# Patient Record
Sex: FEMALE | Race: WHITE | URBAN - METROPOLITAN AREA
[De-identification: names, ages, dates, MRNs, and addresses within clinical notes are randomized per-mention and may not be internally consistent; named-entity substitution may affect disease eponyms.]

---

## 2020-09-11 ENCOUNTER — HOSPITAL ENCOUNTER (OUTPATIENT)
Dept: PHYSICAL THERAPY | Age: 35
Discharge: HOME | End: 2020-09-11
Payer: COMMERCIAL

## 2020-09-11 PROCEDURE — 97161 PT EVAL LOW COMPLEX 20 MIN: CPT | Performed by: PHYSICAL THERAPIST

## 2020-09-11 PROCEDURE — 97110 THERAPEUTIC EXERCISES: CPT | Performed by: PHYSICAL THERAPIST

## 2020-09-11 NOTE — PROGRESS NOTES
[facility] at [address] [place] [address] Drive: [phone]   Fax: [phone]  PLAN OF CARE / [address] [place] [address] FOR PHYSICAL THERAPY SERVICES  Patient Name: Tricia Ruvalcaba : 1985   Medical   Diagnosis: Low back pain [M54.5] Treatment Diagnosis: Low back pain   Onset Date: 9/3/2020     Referral Source: Eric Rosa MD Start of Care [facility]): 2020   Prior Hospitalization: See medical history Provider #: [id number]   Prior Level of Function: Active, biking, walking, sports, independent w/ ADLs   Comorbidities: unremarkable   Medications: Verified on Patient Summary List   The Plan of Care and following information is based on the information from the initial evaluation.   ===========================================================================================  Assessment / key information:  Tricia Ruvalcaba is a 38 y.o.  yo female presents with signs/symptoms consistent with left sided lumbar radicular pain. Pain is alleviated with seated sciatic nerve glides. Patient will continue to benefit from skilled PT services to modify and progress therapeutic interventions, address functional mobility deficits, address ROM deficits, address strength deficits, analyze and address soft tissue restrictions, analyze and cue movement patterns, analyze and modify body mechanics/ergonomics and assess and modify postural abnormalities to attain remaining goals. .    Pt instructed in HEP and will f/u in clinic for PT.  ===========================================================================================  Eval Complexity: History MEDIUM  Complexity : 1-2 comorbidities / personal factors will impact the outcome/ POC ;  Examination  LOW Complexity : 1-2 Standardized tests and measures addressing body structure, function, activity limitation and / or participation in recreation ; Presentation LOW Complexity : Stable, uncomplicated ;  Decision Making MEDIUM Complexity : FOTO score of 26-74; Overall Complexity LOW   Problem List: pain affecting function, decrease ROM, decrease strength, decrease ADL/ functional abilitiies, decrease activity tolerance, decrease flexibility/ joint mobility and decrease transfer abilities   Treatment Plan may include any combination of the following: Therapeutic exercise, Therapeutic activities, Neuromuscular re-education, Physical agent/modality, Gait/balance training, Manual therapy, Patient education and Self Care training  Patient / Family readiness to learn indicated by: asking questions, trying to perform skills and interest  Persons(s) to be included in education: patient (P)  Barriers to Learning/Limitations: no  Measures Taken: NA  Patient Goal (s): \"Exercises/stretches to help strengthen core to prevent flare ups\"   Patient self reported health status: fair  Rehabilitation Potential: good  Goals:  Short Term Goals: To be accomplished in 2 weeks:   Patient will report compliance with HEP 1x/day to aid in rehabilitation program.   Status at IE: NA   Current: Same as IE      Patient will increase lumbar ROM to 100% in all planes to aid in completion of ADLs. Status at IE:75% grossly   Current: Same as IE    Long Term Goals: To be accomplished in 6 weeks:   Patient will increase B LE and trunk strength to 5/5 MMT throughout to aid in completion of ADLs. Status at IE:4/5 in trunk   Current: Same as IE     Patient will report pain no greater than 1-2/10 throughout entire day to aid in completion of ADLs. Status at IE:0-6   Current: Same as IE     Patent will be able to sit for 30mins to be able to drive to appointments and complete ADLs. Status at [address] [facility] after sitting for 5-10 mins   Current: Same as IE     Patient will improve FOTO score to 78 points to demonstrate improvement in functional status.    Status at IE:60   Current: Same as IE   *FOTO score is an established functional score where 100 = no disability*    Frequency / Duration:   Patient to be seen 1  times per week for 6  weeks:  Patient / Caregiver education and instruction: self care and exercises      . Therapist Signature: Amadeo Vilchis DPT, OCS Date: 6/40/7643   Certification Period: NA Time: 4:28 PM   ===========================================================================================  I certify that the above Physical Therapy Services are being furnished while the patient is under my care. I agree with the treatment plan and certify that this therapy is necessary. Physician Signature:        Date:       Time:     Please sign and return to [facility] or you may fax the signed copy to [phone]. Thank you.

## 2020-09-11 NOTE — PROGRESS NOTES
PT DAILY TREATMENT NOTE/LUMBAR EVAL 10-18    Patient Name: Yolie Solis  Date:2020  : 1985  [x]  Patient  Verified  Payor: Alexis Morgan / Plan: [employer] / Product Type: HMO /    In time:3:15  Out time:4:00  Total Treatment Time (min): 45  Visit #: 1 of 6    Treatment Area: Low back pain [M54.5]  SUBJECTIVE  Pain Level (0-10 scale): 1  []constant []intermittent []improving []worsening []no change since onset    Any medication changes, allergies to medications, adverse drug reactions, diagnosis change, or new procedure performed?: [x] No    [] Yes (see summary sheet for update)  Subjective functional status/changes:     Patient has c/c of low back pain since 1 week. . Pain is located low back pain,. BHAVIN: denies, maybe stress. Patient describes pain as tightening, sore, intermittent. No diurnal features. Denies numbness/tingling. Denies popping/clicking. Aggravating factors:sitting, sit<>stand, left hip knee extension. Alleviating factors: foam roller, standing, moving around. Denies red flags: SOB, chest pain, dizziness/lightheadedness, blurred/double vision, HA, chills/fevers, night sweats, change in bowel/bladder control, abdominal pain, difficulty swallowing, slurred speech, unexplained weight gain/loss, nausea, vomiting. PMHx: unremarkable. Surgical Hx: unremarkable. Social Hx: 2 level home, occasional alcohol, denies tobacco, work status. PLOF: walking, sports, bicycle. CLOF: unable to do bicycle. Diagnostic Imaging: x-ray: unremarkable, OA: . Recent falls Hx:NA.       OBJECTIVE/EXAMINATION    15 min []Eval                  []Re-Eval       30 min Therapeutic Exercise:  [] See flow sheet :   Rationale: increase ROM, increase strength and decrease pain to improve the patients ability to complete ADLs          With   [] TE   [] TA   [] neuro   [] other: Patient Education: [x] Review HEP    [] Progressed/Changed HEP based on:   [] positioning   [] body mechanics   [] transfers   [] heat/ice application    [] other:      Physical Therapy Evaluation - Lumbar Spine      OBJECTIVE  Posture:  Lateral Shift: [] R    [] L     [] +  [x] -  Kyphosis: [] Increased [] Decreased   []  WNL  Lordosis:  [] Increased [] Decreased   [] WNL  Pelvic symmetry: [] WNL    [] Other:    Gait:  [] Normal     [] Abnormal:    Active Movements: [] N/A   [] Too acute   [] Other:  ROM % AROM Comments:pain, area   Forward flexion 40-60 100    Extension 20-30 75%    SB right 20-30 75    SB left 20-30 75    Rotation right 5-10 100    Rotation left 5-10 100      Repeated Movements   Effects on present pain: produces (ME), abolishes (A), increases (incr), decreases (decr), centralizes (C), peripheral (PH), no effect (NE)   Pre-Test Sx Flexion Repeated Flexion Extension Repeated Extension Repeated SBL Repeated SBR   Sitting          Standing    pain NE     Lying      N/A N/A   Comments:  Side Glide:  Sustained passive positioning test:    Neuro Screen [] WNL  Myotome/Dermatome/Reflexes:  Comments:    Dural Mobility:  SLR Supine: [] R    [x] L    [x] +    [] -  @ (degrees):   Slump Test: [] R    [x] L    [x] +    [] -  @ (degrees):   Prone Knee Bend: [] R    [] L    [] +    [] -     Palpation  [] Min  [] Mod  [] Severe    Location:  [] Min  [] Mod  [] Severe    Location:    Strength   L(0-5) R (0-5) N/T   Hip Flexion (L1,2) 4+ 5 []   Knee Extension (L3,4) 5 5 []   Ankle Dorsiflexion (L4) 5 5 []   Great Toe Extension (L5)   [x]   Ankle Plantarflexion (S1) 5 5 []   Knee Flexion (S1,2) 5 5 []   Abdominals 4 4 []   Paraspinals 4 4 []   Back Rotators 4 4 []   Gluteus Melvin 4+ 4+ []   Hip Abduction 4 4 []         Special Tests  Lumbar:  Lumb.  Compression: [] Pos  [] Neg               Lumbar Distraction:   [] Pos  [] Neg    Quadrant:  [] Pos  [] Neg   [] Flex  [] Ext    Sacroilliac:  Gaenslen's: [] R    [] L    [] +    [x] -     Compression: [] +    [] -     Gapping:  [] +    [] -     Thigh Thrust: [] R    [] L    [] +    [] - Hip: Hedda Uriah:  [] R    [] L    [] +    [x] -     Scour:  [] R    [] L    [] +    [x] -     Piriformis: [] R    [] L    [] +    [] -     Other tests/comments:       Pain Level (0-10 scale) post treatment: 0    ASSESSMENT/Changes in Function: Patient presents with signs/symptoms consistent with left sided lumbar radicular pain. Pain is alleviated with seated sciatic nerve glides. Patient will continue to benefit from skilled PT services to modify and progress therapeutic interventions, address functional mobility deficits, address ROM deficits, address strength deficits, analyze and address soft tissue restrictions, analyze and cue movement patterns, analyze and modify body mechanics/ergonomics and assess and modify postural abnormalities to attain remaining goals.      [x]  See Plan of Care  []  See progress note/recertification  []  See Discharge Summary         Progress towards goals / Updated goals:  See POC    PLAN  []  Upgrade activities as tolerated     [x]  Continue plan of care  []  Update interventions per flow sheet       []  Discharge due to:_  []  Other:_      Jessika Kunz, PT, DPT 9/11/2020  3:25 PM

## 2020-09-16 ENCOUNTER — HOSPITAL ENCOUNTER (OUTPATIENT)
Dept: PHYSICAL THERAPY | Age: 35
Discharge: HOME | End: 2020-09-16
Payer: COMMERCIAL

## 2020-09-16 PROCEDURE — 97110 THERAPEUTIC EXERCISES: CPT | Performed by: PHYSICAL THERAPIST

## 2020-09-16 NOTE — PROGRESS NOTES
PT DAILY TREATMENT NOTE    Patient Name: Marion Yusuf  Date:2020  : 1985  [x]  Patient  Verified  Payor: Dulce Maria Stable / Plan: [employer] / Product Type: HMO /    In time:8:03  Out time:8:56  Total Treatment Time (min): 53  Total Timed Codes (min): 48  Visit #: 2 of 6    Treatment Area: Low back pain [M54.5]    SUBJECTIVE  Pain Level (0-10 scale): 3  Any medication changes, allergies to medications, adverse drug reactions, diagnosis change, or new procedure performed?: [x] No    [] Yes (see summary sheet for update)  Subjective functional status/changes:   [] No changes reported  Feels okay. The nerve glide seems to be helping, but I might have had some more numbness, not sure if I'm over examining. OBJECTIVE    53 min Therapeutic Exercise:  [x] See flow sheet :   Rationale: increase ROM, increase strength and decrease pain to improve the patients ability to complete ADLs    With   [] TE   [] TA   [] neuro   [] other: Patient Education: [x] Review HEP    [] Progressed/Changed HEP based on:   [] positioning   [] body mechanics   [] transfers   [] heat/ice application    [] other:      Other Objective/Functional Measures: NA     Pain Level (0-10 scale) post treatment: 3    ASSESSMENT/Changes in Function: Patient responds well to treatment session. Patient is progressing well. Requires minimal cues for exercise form/technique. Provided with HEP. Will progress as tolerated.  No adverse effects were noted from today's treatment session       Patient will continue to benefit from skilled PT services to modify and progress therapeutic interventions, address functional mobility deficits, address ROM deficits, address strength deficits, analyze and address soft tissue restrictions, analyze and cue movement patterns, analyze and modify body mechanics/ergonomics, assess and modify postural abnormalities    []  See Plan of Care  []  See progress note/recertification  []  See Discharge Summary         Progress towards goals / Updated goals:  Short Term Goals: To be accomplished in 2 weeks:                   Patient will report compliance with HEP 1x/day to aid in rehabilitation program.                   Status at IE: NA                   Current: Reports compliance with HEP, Met 9/16/2020                      Patient will increase lumbar ROM to 100% in all planes to aid in completion of ADLs. Status at IE:75% grossly                   Current: Same as IE     Long Term Goals: To be accomplished in 6 weeks:                   Patient will increase B LE and trunk strength to 5/5 MMT throughout to aid in completion of ADLs. Status at IE:4/5 in trunk                   Current: Same as IE                      Patient will report pain no greater than 1-2/10 throughout entire day to aid in completion of ADLs. Status at IE:0-6                   Current: Same as IE                      Patent will be able to sit for 30mins to be able to drive to appointments and complete ADLs. Status at [address] [facility] after sitting for 5-10 mins                   Current: Same as IE                      Patient will improve FOTO score to 78 points to demonstrate improvement in functional status.                    Status at IE:60                   Current: Same as IE              *FOTO score is an established functional score where 100 = no disability*    PLAN  []  Upgrade activities as tolerated     [x]  Continue plan of care  []  Update interventions per flow sheet       []  Discharge due to:_  []  Other:_      Leon Rush PT, DPT 9/16/2020  8:19 AM    Future Appointments   Date Time Provider [place]   9/25/2020  3:15 PM Alvia Krabbe, PT, DPT [facility]   10/2/2020  2:30 PM Alvia Krabbe, PT, DPT [facility]   10/9/2020  2:30 PM Alvia Krabbe, PT, DPT [facility]   10/16/2020  2:30 PM Alvia Krabbe, PT, DPT [facility]   10/23/2020  2:30 PM Alvia Krabbe, PT, DPT [facility]

## 2020-09-25 ENCOUNTER — HOSPITAL ENCOUNTER (OUTPATIENT)
Dept: PHYSICAL THERAPY | Age: 35
Discharge: HOME | End: 2020-09-25
Payer: COMMERCIAL

## 2020-09-25 PROCEDURE — 97110 THERAPEUTIC EXERCISES: CPT | Performed by: PHYSICAL THERAPIST

## 2020-09-25 NOTE — PROGRESS NOTES
PT DAILY TREATMENT NOTE    Patient Name: Jenny Puente  Date:2020  : 1985  [x]  Patient  Verified  Payor: Jennifer Zamarripa / Plan: [employer] / Product Type: HMO /    In time:3:17  Out time:4:13  Total Treatment Time (min): 56  Total Timed Codes (min): 64   Visit #: 3 of 6    Treatment Area: Low back pain [M54.5]    SUBJECTIVE  Pain Level (0-10 scale): 3  Any medication changes, allergies to medications, adverse drug reactions, diagnosis change, or new procedure performed?: [x] No    [] Yes (see summary sheet for update)  Subjective functional status/changes:   [] No changes reported  Feels okay. Just having a rough week    OBJECTIVE    56 min Therapeutic Exercise:  [x] See flow sheet :   Rationale: increase ROM, increase strength and decrease pain to improve the patients ability to complete ADLs       With   [] TE   [] TA   [] neuro   [] other: Patient Education: [x] Review HEP    [] Progressed/Changed HEP based on:   [] positioning   [] body mechanics   [] transfers   [] heat/ice application    [] other:      Other Objective/Functional Measures: NA     Pain Level (0-10 scale) post treatment: 0    ASSESSMENT/Changes in Function: Patient responds well to treatment session. Patient is progressing well. Minimal difficulty with exercises as prescribed. Will progress as tolerated.  . No adverse effects were noted from today's treatment session       Patient will continue to benefit from skilled PT services to modify and progress therapeutic interventions, address functional mobility deficits, address ROM deficits, address strength deficits, analyze and address soft tissue restrictions, analyze and cue movement patterns, analyze and modify body mechanics/ergonomics, assess and modify postural abnormalities    []  See Plan of Care  []  See progress note/recertification  []  See Discharge Summary         Progress towards goals / Updated goals:  Short Term Goals: To be accomplished in 2 weeks:                   AGIIIBR will report compliance with HEP 1x/day to aid in rehabilitation program.                   Status at IE: NA                   Current: Reports compliance with HEP, Met 9/16/2020                      Patient will increase lumbar ROM to 100% in all planes to aid in completion of ADLs.                  Status at IE:75% grossly                   Current: 100%, met 9/25/2020     Long Term Goals: To be accomplished in 6 weeks:                   Patient will increase B LE and trunk strength to 5/5 MMT throughout to aid in completion of ADLs.                  Status at IE:4/5 in trunk                   Current: Same as IE                      Patient will report pain no greater than 1-2/10 throughout entire day to aid in completion of ADLs.                  Status at IE:0-6                   Current: Same as IE                      Patent will be able to sit for 30mins to be able to drive to appointments and complete ADLs.                  Status at [facility] after sitting for 5-10 mins                   Current: Same as IE                      Patient will improve FOTO score to 78 points to demonstrate improvement in functional status.                    Status at IE:60                   Current: Same as IE              *FOTO score is an established functional score where 100 = no disability*    PLAN  []  Upgrade activities as tolerated     [x]  Continue plan of care  []  Update interventions per flow sheet       []  Discharge due to:_  []  Other:_      Sonia Nogueira PT, DPT 9/25/2020  4:15 PM    Future Appointments   Date Time Provider [place]   10/2/2020  2:30 PM Marylin Contreras PT, DPT [facility]   10/9/2020  2:30 PM SUAD [facility]PTNE[facility]   10/16/2020  2:30 PM Marylin Contreras PT, DPT [facility]   10/23/2020  2:30 PM Marylin Contreras PT, DPT [facility]

## 2020-10-02 ENCOUNTER — HOSPITAL ENCOUNTER (OUTPATIENT)
Dept: PHYSICAL THERAPY | Age: 35
Discharge: HOME | End: 2020-10-02
Payer: COMMERCIAL

## 2020-10-02 PROCEDURE — 97110 THERAPEUTIC EXERCISES: CPT | Performed by: PHYSICAL THERAPIST

## 2020-10-02 NOTE — PROGRESS NOTES
PT DAILY TREATMENT NOTE    Patient Name: Chantell Cedeno  Date:10/2/2020  : 1985  [x]  Patient  Verified  Payor: Laura Valenzuela / Plan: [employer] / Product Type: HMO /    In time:2:35  Out time:3:39  Total Treatment Time (min): 64  Total Timed Codes (min): 59   Visit #: 4 of 6    Treatment Area: Low back pain [M54.5]    SUBJECTIVE  Pain Level (0-10 scale): 1  Any medication changes, allergies to medications, adverse drug reactions, diagnosis change, or new procedure performed?: [x] No    [] Yes (see summary sheet for update)  Subjective functional status/changes:   [] No changes reported  Feels okay. No new issues. OBJECTIVE    64 min Therapeutic Exercise:  [x] See flow sheet :   Rationale: increase ROM, increase strength and decrease pain to improve the patients ability to complete ADLs    With   [] TE   [] TA   [] neuro   [] other: Patient Education: [x] Review HEP    [] Progressed/Changed HEP based on:   [] positioning   [] body mechanics   [] transfers   [] heat/ice application    [] other:      Other Objective/Functional Measures: NA     Pain Level (0-10 scale) post treatment: 0    ASSESSMENT/Changes in Function: Patient responds well to treatment session. Patient requires VC for correct form/technique during chop/lift core exercises. Will progress as tolerated.  . No adverse effects were noted from today's treatment session       Patient will continue to benefit from skilled PT services to modify and progress therapeutic interventions, address functional mobility deficits, address ROM deficits, address strength deficits, analyze and address soft tissue restrictions, analyze and cue movement patterns, analyze and modify body mechanics/ergonomics, assess and modify postural abnormalities    []  See Plan of Care  []  See progress note/recertification  []  See Discharge Summary         Progress towards goals / Updated goals:  Short Term Goals: To be accomplished in 2 weeks:                   Patient will report compliance with HEP 1x/day to aid in rehabilitation program.                   Status at IE: NA                   Current: Reports compliance with HEP, Met 9/16/2020                      Patient will increase lumbar ROM to 100% in all planes to aid in completion of ADLs.                  Status at IE:75% grossly                   Current: 100%, met 9/25/2020     Long Term Goals: To be accomplished in 6 weeks:                   Patient will increase B LE and trunk strength to 5/5 MMT throughout to aid in completion of ADLs.                  Status at IE:4/5 in trunk                   Current: Same as IE                      Patient will report pain no greater than 1-2/10 throughout entire day to aid in completion of ADLs.                  Status at IE:0-6                   Current: 0-1/10, Met 10/2/2020                      Patent will be able to sit for 30mins to be able to drive to appointments and complete ADLs.                  Status at [facility] after sitting for 5-10 mins                   Current: Same as IE                      Patient will improve FOTO score to 78 points to demonstrate improvement in functional status.                    Status at IE:60                   Current: Same as IE              *FOTO score is an established functional score where 100 = no disability*       PLAN  []  Upgrade activities as tolerated     [x]  Continue plan of care  []  Update interventions per flow sheet       []  Discharge due to:_  []  Other:_      Kaylynn Damon PT, DPT 10/2/2020  2:44 PM    Future Appointments   Date Time Provider [place]   10/9/2020  2:30 PM Michelle Medina PT [facility]   10/16/2020  2:30 PM Jaya Gutierrez PT, DPT [facility]   10/23/2020  2:30 PM Jaya Gutierrez PT, DPT [facility]

## 2020-10-09 ENCOUNTER — HOSPITAL ENCOUNTER (OUTPATIENT)
Dept: PHYSICAL THERAPY | Age: 35
Discharge: HOME | End: 2020-10-09
Payer: COMMERCIAL

## 2020-10-09 PROCEDURE — 97110 THERAPEUTIC EXERCISES: CPT

## 2020-10-09 NOTE — PROGRESS NOTES
PT DAILY TREATMENT NOTE - [facility]     Patient Name: Darryl Casarez  Date:10/9/2020  : 1985  [x]  Patient  Verified  Payor: Carisa Hansen / Plan: [employer] / Product Type: HMO /    In time:1430  Out time:1515  Total Treatment Time (min): 45  Total Timed Codes (min): 45   Visit #: 5 of 7    Treatment Area: Low back pain [M54.5]    SUBJECTIVE  Pain Level (0-10 scale): 0  Any medication changes, allergies to medications, adverse drug reactions, diagnosis change, or new procedure performed?: [x] No    [] Yes (see summary sheet for update)  Subjective functional status/changes:   [] No changes reported    Patient reports that her low back was sore all week following last visit. She states that she feels better today. OBJECTIVE    45 min Therapeutic Exercise:  [x] See flow sheet :   Rationale: increase ROM, increase strength and decrease pain to improve the patients ability to complete ADLs          With   [x] TE   [] TA   [] neuro   [] other: Patient Education: [x] Review HEP    [] Progressed/Changed HEP based on:   [] positioning   [] body mechanics   [] transfers   [] heat/ice application    [] other:      Other Objective/Functional Measures: NA     Pain Level (0-10 scale) post treatment: 0    ASSESSMENT/Changes in Function: Patient responds well to treatment session. Patient required cues to perform chops and lifts with proper mechanics. Also reviewed proper hip hinge with dead lift exercise. Patient responded well to cues demonstrated safe body mechanics. Will advance exercise as appropriate. No adverse effects were noted from today's treatment session.     Patient will continue to benefit from skilled PT services to modify and progress therapeutic interventions, address functional mobility deficits, address ROM deficits, address strength deficits, analyze and address soft tissue restrictions, analyze and cue movement patterns, analyze and modify body mechanics/ergonomics, assess and modify postural abnormalities, address imbalance/dizziness and instruct in home and community integration to attain remaining goals. []  See Plan of Care  []  See progress note/recertification  []  See Discharge Summary         Progress towards goals / Updated goals:  Short Term Goals: To be accomplished in 2 weeks:                   [id number] will report compliance with HEP 1x/day to aid in rehabilitation program.                   Status at IE: NA                   Current: Reports compliance with HEP, Met 9/16/2020                      Patient will increase lumbar ROM to 100% in all planes to aid in completion of ADLs.                  Status at IE:75% grossly                   Current: 100%, met 9/25/2020     Long Term Goals: To be accomplished in 6 weeks:                   Patient will increase B LE and trunk strength to 5/5 MMT throughout to aid in completion of ADLs.                  Status at IE:4/5 in trunk                   Current: In-progress 4+/5 10/19/2020                      Patient will report pain no greater than 1-2/10 throughout entire day to aid in completion of ADLs.                  Status at IE:0-6                   Current: 0-1/10, Met 10/2/2020                      Patent will be able to sit for 30mins to be able to drive to appointments and complete ADLs.                  Status at [address] [place] [address] after sitting for 5-10 mins                   Current: Same as IE                      Patient will improve FOTO score to 78 points to demonstrate improvement in functional status.                    Status at IE:60                   Current: Same as IE              *FOTO score is an established functional score where 100 = no disability*    PLAN  []  Upgrade activities as tolerated     [x]  Continue plan of care  []  Update interventions per flow sheet       []  Discharge due to:_  []  Other:_      Meka Kemp PT, DPT 10/9/2020  2:37 PM    Future Appointments   Date Time Provider Monica Rock 10/14/2020  1:00 PM Tariq Forbes PT, SARAN [facility]   10/23/2020  2:30 PM Tariq Forbes PT, DPT [facility]

## 2020-10-14 ENCOUNTER — HOSPITAL ENCOUNTER (OUTPATIENT)
Dept: PHYSICAL THERAPY | Age: 35
Discharge: HOME | End: 2020-10-14
Payer: COMMERCIAL

## 2020-10-14 PROCEDURE — 97110 THERAPEUTIC EXERCISES: CPT | Performed by: PHYSICAL THERAPIST

## 2020-10-14 NOTE — PROGRESS NOTES
PT DAILY TREATMENT NOTE    Patient Name: Elan Ro  Date:10/14/2020  : 1985  [x]  Patient  Verified  Payor: Hakeem Esparza / Plan: [employer] / Product Type: HMO /    In time:1:00  Out time:1:50  Total Treatment Time (min): 50  Total Timed Codes (min): 45  Visit #: 18 of 20    Treatment Area: Low back pain [M54.5]    SUBJECTIVE  Pain Level (0-10 scale): 1  Any medication changes, allergies to medications, adverse drug reactions, diagnosis change, or new procedure performed?: [x] No    [] Yes (see summary sheet for update)  Subjective functional status/changes:   [] No changes reported  Feels good. No new issues. OBJECTIVE  45 min Therapeutic Exercise:  [x] See flow sheet :   Rationale: increase ROM, increase strength and decrease pain to improve the patients ability to complete ADLs       With   [] TE   [] TA   [] neuro   [] other: Patient Education: [x] Review HEP    [] Progressed/Changed HEP based on:   [] positioning   [] body mechanics   [] transfers   [] heat/ice application    [] other:      Other Objective/Functional Measures: NA     Pain Level (0-10 scale) post treatment: 0    ASSESSMENT/Changes in Function: Patient responds well to treatment session. Patient is progressing well overall. No new issues. Will discharge next week. . No adverse effects were noted from today's treatment session       Patient will continue to benefit from skilled PT services to modify and progress therapeutic interventions, address functional mobility deficits, address ROM deficits, address strength deficits, analyze and address soft tissue restrictions, analyze and cue movement patterns, analyze and modify body mechanics/ergonomics, assess and modify postural abnormalities    []  See Plan of Care  []  See progress note/recertification  []  See Discharge Summary         Progress towards goals / Updated goals:  Short Term Goals: To be accomplished in 2 weeks:                   Patient will report compliance with HEP 1x/day to aid in rehabilitation program.                   Status at IE: NA                   Current: Reports compliance with HEP, Met 9/16/2020                      Patient will increase lumbar ROM to 100% in all planes to aid in completion of ADLs.                  Status at IE:75% grossly                   Current: 100%, met 9/25/2020     Long Term Goals: To be accomplished in 6 weeks:                   Patient will increase B LE and trunk strength to 5/5 MMT throughout to aid in completion of ADLs.                  Status at IE:4/5 in trunk                   Current: In-progress 4+/5 10/19/2020                      Patient will report pain no greater than 1-2/10 throughout entire day to aid in completion of ADLs.                  Status at IE:0-6                   Current: 0-1/10, Met 10/2/2020                      Patent will be able to sit for 30mins to be able to drive to appointments and complete ADLs.                  Status at [address] [place] [address] after sitting for 5-10 mins                   Current: Reports no pain sitting for 30mins, Met 10/14/2020                      Patient will improve FOTO score to 78 points to demonstrate improvement in functional status.                    Status at IE:60                   Current: Same as IE              *FOTO score is an established functional score where 100 = no disability*       PLAN  []  Upgrade activities as tolerated     [x]  Continue plan of care  []  Update interventions per flow sheet       []  Discharge due to:_  []  Other:_      Kaylynn Damon, PT, DPT 10/14/2020  1:25 PM    Future Appointments   Date Time Provider Monica Rock   10/23/2020  2:30 PM Jaya Gutierrez, PT, DPT [facility]

## 2020-10-16 ENCOUNTER — APPOINTMENT (OUTPATIENT)
Dept: PHYSICAL THERAPY | Age: 35
End: 2020-10-16
Payer: COMMERCIAL

## 2020-10-23 ENCOUNTER — HOSPITAL ENCOUNTER (OUTPATIENT)
Dept: PHYSICAL THERAPY | Age: 35
Discharge: HOME | End: 2020-10-23
Payer: COMMERCIAL

## 2020-10-23 PROCEDURE — 97110 THERAPEUTIC EXERCISES: CPT | Performed by: PHYSICAL THERAPIST

## 2020-10-23 NOTE — PROGRESS NOTES
PT DAILY TREATMENT NOTE    Patient Name: Luis Raymond  REOT:  : 1985  [x]  Patient  Verified  Payor: Anshu Brown / Plan: [employer] / Product Type: HMO /    In time:2:33  Out time:3:28  Total Treatment Time (min): 55  Total Timed Codes (min): 55   Visit #: 7 of 7    Treatment Area: Low back pain [M54.5]    SUBJECTIVE  Pain Level (0-10 scale): 1  Any medication changes, allergies to medications, adverse drug reactions, diagnosis change, or new procedure performed?: [x] No    [] Yes (see summary sheet for update)  Subjective functional status/changes:   [] No changes reported  Feels good. Have been a little stressed from work, and I can have more pain, but it's been manageable. Reports that she has improved greatly since the start of therapy    OBJECTIVE    55 min Therapeutic Exercise:  [x] See flow sheet :   Rationale: increase ROM, increase strength and decrease pain to improve the patients ability to complete ADLs    With   [] TE   [] TA   [] neuro   [] other: Patient Education: [x] Review HEP    [] Progressed/Changed HEP based on:   [] positioning   [] body mechanics   [] transfers   [] heat/ice application    [] other:      Other Objective/Functional Measures: NA     Pain Level (0-10 scale) post treatment: 0    ASSESSMENT/Changes in Function: Patient responds well to treatment session. Patient has made good progress to date, meeting all goals. Is ready to be discharged to independent [facility] at this time. . No adverse effects were noted from today's treatment session    []  See Plan of Care  []  See progress note/recertification  [x]  See Discharge Summary         Progress towards goals / Updated goals:  Short Term Goals: To be accomplished in 2 weeks:                   ZFMCTLY will report compliance with HEP 1x/day to aid in rehabilitation program.                   Status at IE: NA                   Current: Reports compliance with HEP, Met 2020                      Patient will increase lumbar ROM to 100% in all planes to aid in completion of ADLs.                  Status at IE:75% grossly                   Current: 100%, met 9/25/2020     Long Term Goals: To be accomplished in 6 weeks:                   Patient will increase B LE and trunk strength to 5/5 MMT throughout to aid in completion of ADLs.                  Status at IE:4/5 in trunk                   Current:   5/5, grossly. 10/23/2020                      Patient will report pain no greater than 1-2/10 throughout entire day to aid in completion of ADLs.                  Status at IE:0-6                   Current: 0-1/10, Met 10/2/2020                      Patent will be able to sit for 30mins to be able to drive to appointments and complete ADLs.                  Status at [address] [place] [address] after sitting for 5-10 mins                   Current: Reports no pain sitting for 30mins, Met 10/14/2020                      Patient will improve FOTO score to 78 points to demonstrate improvement in functional status.                  Status at IE:60                   Current: 94, Met 10/23/2020               *FOTO score is an established functional score where 100 = no disability*    PLAN  []  Upgrade activities as tolerated     [x]  Continue plan of care  []  Update interventions per flow sheet       []  Discharge due to:_  []  Other:_      Mendoza Trujillo, PT, DPT 10/23/2020  2:49 PM    No future appointments.

## 2020-10-23 NOTE — PROGRESS NOTES
[facility] at [address] [place] [address] Drive: [phone]   Fax: [phone]  Discharge Summary  Patient Name: Moiz Zaman : 1985   Medical   Diagnosis: Low back pain [M54.5] Treatment Diagnosis: Low back pain   Onset Date: 9/3/2020     Referral Source: Liset Solis MD [facility]): 2020   Prior Hospitalization: See medical history Provider #: [id number]   Prior Level of Function: Active, biking, walking, sports, independent w/ ADLs   Comorbidities: unremarkable   Medications: Verified on Patient Summary List      ===========================================================================================  Assessment / Summary of Care:  Moiz Zaman is a 38 y.o.  yo female with left sided lumbar radicular pain. Patient has made good progress to date, meeting all goals. Is ready to be discharged to independent [facility] at this time. . No adverse effects were noted from today's treatment session  ===========================================================================================    Plan: Discharge to independent [facility]. Goals:   Short Term Goals: To be accomplished in 2 weeks:                   XCLUQAO will report compliance with HEP 1x/day to aid in rehabilitation program.                   Status at IE: NA                   Current: Reports compliance with HEP, Met 2020                      Patient will increase lumbar ROM to 100% in all planes to aid in completion of ADLs.                  Status at IE:75% grossly                   Current: 100%, met 2020     Long Term Goals: To be accomplished in 6 weeks:                   Patient will increase B LE and trunk strength to 5/5 MMT throughout to aid in completion of ADLs.                  Status at IE:4/ in trunk                   Current:   5/5, grossly.  10/23/2020                      Patient will report pain no greater than 1-2/10 throughout entire day to aid in completion of ADLs.                  Status at IE:0-6                   Current: 0-1/10, Met 10/2/2020                      Patent will be able to sit for 30mins to be able to drive to appointments and complete ADLs.                  Status at [address] [facility][note 3] after sitting for 5-10 mins                   Current: Reports no pain sitting for 30mins, Met 10/14/2020                      Patient will improve FOTO score to 78 points to demonstrate improvement in functional status.                  Status at IE:60                   Current: 94, Met 10/23/2020                    *FOTO score is an established functional score where 100 = no disability*    ===========================================================================================  Subjective: Feels good. Reports that she has been a little stressed from work and I can have more pain as a result, but it's been manageable.  Reports that she has improved greatly since the start of therapy      Objective: MMT 5/5 grossly    Therapist Signature: Kaylynn Damon PT, DPT, OCS Date: 10/23/2020     Time: 3:53 PM